# Patient Record
Sex: MALE | Race: OTHER | HISPANIC OR LATINO | URBAN - METROPOLITAN AREA
[De-identification: names, ages, dates, MRNs, and addresses within clinical notes are randomized per-mention and may not be internally consistent; named-entity substitution may affect disease eponyms.]

---

## 2022-03-04 ENCOUNTER — EMERGENCY (EMERGENCY)
Facility: HOSPITAL | Age: 37
LOS: 1 days | Discharge: HOME | End: 2022-03-04
Admitting: PERSONAL EMERGENCY RESPONSE ATTENDANT
Payer: MEDICAID

## 2022-03-04 VITALS
OXYGEN SATURATION: 100 % | DIASTOLIC BLOOD PRESSURE: 95 MMHG | HEART RATE: 90 BPM | RESPIRATION RATE: 18 BRPM | SYSTOLIC BLOOD PRESSURE: 160 MMHG | TEMPERATURE: 98 F

## 2022-03-04 PROCEDURE — 99284 EMERGENCY DEPT VISIT MOD MDM: CPT

## 2022-03-04 NOTE — ED ADULT TRIAGE NOTE - CHIEF COMPLAINT QUOTE
Pt c/o lump behind R. ear since today with R. facial numbness since 1 hour ago. Also endorsing h/a for 1 month that resolved 1 hr ago. Also c/o slight tongue numbness, denies tongue swelling. Denies dizziness, difficulty breathing, fever, chills. +b/l strength. +b/l sensation. No facial droop noted. Speech clear. MD Cortez for stroke eval. No code stroke called. Denies pmhx.

## 2022-03-05 NOTE — ED PROVIDER NOTE - NSFOLLOWUPINSTRUCTIONS_ED_ALL_ED_FT
Are there different types of headache?  Yes. There are different types of headache. The 2 most common types are:    ?Tension headaches – Tension headaches cause pressure or tightness on both sides of the head.    ?Migraine headaches – Migraine headaches often start off mild and then get worse. They often affect just 1 side of the head. The pain often feels like it is pounding or throbbing. Routine activities like walking or climbing stairs can make the headache worse. Migraines can also cause nausea or vomiting, or make you sensitive to light and sound.    Is there anything I can do to feel better when I have a headache?  Yes. Some people feel better if they:    ?Take non-prescription pain medicines (but check with your doctor first if you have a health condition or already take prescription medicines)    ?Lie down in a cool, dark, quiet room (this works best for migraine headaches)    Should I see a doctor or nurse?  See a doctor or nurse right away if:    ?Your headache comes on suddenly, quickly becomes severe, or could be described as "the worst headache of your life"    ?You have a fever or stiff neck with your headache    ?You also have a seizure, personality changes or confusion, or you pass out    ?Your headache began right after you exercised or had a minor injury    ?You have new headaches, especially if you are pregnant or older than 40    ?You have weakness, numbness, or trouble seeing (migraine headaches can sometimes cause these symptoms, but you should be seen right away the first time these symptoms happen)    You should also see a doctor or nurse if you get headaches often or if your headaches are severe.    Is there anything I can do to keep from getting headaches?  Yes. Some people find that their headaches are triggered by certain foods or things they do. To keep from getting headaches in the future, you can keep a "headache calendar." In the calendar, write down every time you have a headache and what you ate and did before it started. That way you can find out if there is anything you should avoid eating or doing. You can also write down what medicine you took for the headache and whether or not it helped.    Some common headache triggers include:    ?Stress    ?Skipping meals or eating too little    ?Having too little or too much caffeine    ?Sleeping too much or too little    ?Drinking alcohol    ?Certain drinks or foods    If your headaches are frequent, severe, or long-lasting, your doctor can suggest ways to try to prevent them. For example, it might help to learn relaxation techniques and ways to manage stress. In some cases, medicines can also help.    How is headache treated?  There are lots of medicines that can ease the pain of headaches. You can try taking acetaminophen (sample brand name: Tylenol), ibuprofen (sample brand names: Advil, Motrin), or naproxen (sample brand name: Aleve). There are prescription medicines that can help, too. The right medicine for you will depend on what type of headaches you get, how often you get them, and how bad they are.    If you get headaches often, work with your doctor to find a treatment that helps. Do not try to manage frequent headaches on your own with non-prescription pain medicines. Taking non-prescription pain medicines too often can actually cause more headaches later.

## 2022-03-05 NOTE — ED PROVIDER NOTE - OBJECTIVE STATEMENT
35 y/o M w/ no pertinent PMHx p/w right sided headache since 9 PM today with associated right sided facial numbness and a bump with swelling near the right ear. Pt reports a previous history of mild, intermittent headache that lasted approximately 1 month. Pt denies fever, chills, cough, rhinorrhea, blurry vision, double vision, or taking any medications for the symptoms.    Pt was offered a CT to assess complaints but refused.

## 2022-03-05 NOTE — ED PROVIDER NOTE - PATIENT PORTAL LINK FT
You can access the FollowMyHealth Patient Portal offered by [facility] by registering at the following website: [email or web address]. By joining [facility]’s FollowMyHealth portal, you will also be able to view your health information using other applications (apps) compatible with our system.

## 2022-03-05 NOTE — ED PROVIDER NOTE - CLINICAL SUMMARY MEDICAL DECISION MAKING FREE TEXT BOX
37 y/o M w/ no pertinent PMHx p/w right sided headache since 9 PM today with associated right sided facial numbness and a bump with swelling near the right ear. Pt reports a previous history of mild, intermittent headache that lasted approximately 1 month. Pt denies fever, chills, cough, rhinorrhea, blurry vision, double vision, or taking any medications for the symptoms. - offered CT Head but patient refused. Declined medication as well. Pt given strict instructions when to return to patient and family a bedside - they verbalized understanding.

## 2022-03-05 NOTE — ED PROVIDER NOTE - PHYSICAL EXAMINATION
CONSTITUTIONAL: Well-appearing; well-nourished; in no apparent distress. Non-toxic appearing.   NEURO: Alert & oriented. Gait steady without assistance. Sensory and motor functions are grossly intact.  PSYCH: Mood appropriate. Thought processes intact.   EYES: PERRL, EOMs intact, no nystagmus.  EARS: EACs without edema. Right TM with mild erythema, no bulging, light reflex present. Left TM WNL.  NECK: Supple. no cervical lymphadenopathy.   CARD: Regular rate and rhythm, no murmurs  RESP: No accessory muscle use; breath sounds clear and equal bilaterally; no wheezes, rhonchi, or rales     ABD: Soft; non-distended; non-tender.   SKIN: Warm; dry; no apparent lesions or exudate

## 2022-03-05 NOTE — ED PROVIDER NOTE - NS ED ROS FT
ROS:  GENERAL: No fever, no chills  EYES: as per HPI  HEENT: as per HPI  CARDIAC: no chest pain  PULMONARY: no cough, no shortness of breath  GI: no abdominal pain, no nausea, no vomiting, no diarrhea, no constipation  SKIN: no rashes  NEURO: as per HPI  MSK: No joint pain  All other systems reviewed as negative.

## 2022-03-05 NOTE — ED PROVIDER NOTE - CHIEF COMPLAINT
The patient is a 36y Male complaining of  The patient is a 36y Male complaining of HA with numbness on the R.

## 2022-04-26 ENCOUNTER — EMERGENCY (EMERGENCY)
Facility: HOSPITAL | Age: 37
LOS: 1 days | Discharge: HOME | End: 2022-04-26
Attending: STUDENT IN AN ORGANIZED HEALTH CARE EDUCATION/TRAINING PROGRAM | Admitting: STUDENT IN AN ORGANIZED HEALTH CARE EDUCATION/TRAINING PROGRAM
Payer: MEDICAID

## 2022-04-26 VITALS
OXYGEN SATURATION: 100 % | TEMPERATURE: 98 F | DIASTOLIC BLOOD PRESSURE: 83 MMHG | HEART RATE: 81 BPM | RESPIRATION RATE: 16 BRPM | SYSTOLIC BLOOD PRESSURE: 165 MMHG

## 2022-04-26 PROCEDURE — 99283 EMERGENCY DEPT VISIT LOW MDM: CPT

## 2022-04-26 RX ORDER — PHENYLEPHRINE HCL 0.25 %
2 AEROSOL, SPRAY WITH PUMP (ML) NASAL
Qty: 15 | Refills: 0
Start: 2022-04-26 | End: 2022-04-30

## 2022-04-26 NOTE — ED PROVIDER NOTE - NOSE FINDINGS
left nasal septal with inflammation, clotted nasal blood, no active bleeding, no septal hematoma, no ulceration, no congestion, no sinus tenderness, no rhinorrhea, no postnasal drip

## 2022-04-26 NOTE — ED PROVIDER NOTE - OBJECTIVE STATEMENT
37 yo M with no significant PMH presents to ER c/o intermittent left nostril bleeding for x3 days. Reports bleeding from left nostril, intermittent, lasting for a hour. Admits some bleeding today; denies no injury/trauma, nose picking. Admits the air is dry in the house. Denies any fever, chills, coughing, sneezing, headache, neck pain, dysphagia, n/v/d, abdominal pain, weakness, numbness, anticoagulation use or any other complaints.

## 2022-04-26 NOTE — ED PROVIDER NOTE - PROGRESS NOTE DETAILS
PA ROSA: Pt request PCP and ENT referral. Discussed with attending, patient can be discharged home to f/u with PCP, ENT. The patient was given verbal and written discharge instructions. Specifically, instructions when to return to the ED and when to seek follow-up from their pcp was discussed. Any specialty follow-up was discussed, including how to make an appointment.  Instructions were discussed in simple, plain language and was understood by the patient. The patient understands that should their symptoms worsen or any new symptoms arise, they should return to the ED immediately for further evaluation. All pt's questions were answered. Patient verbalizes understanding.

## 2022-04-26 NOTE — ED PROVIDER NOTE - NSFOLLOWUPINSTRUCTIONS_ED_ALL_ED_FT
Rest, drink plenty of fluids.  Advance activity as tolerated. Use nasal spray as directed. Recommend air humidifier. Follow up with your primary care physician in 48-72 hours- bring copies of your results.  Return to the ER for worsening or persistent symptoms, and/or ANY NEW OR CONCERNING SYMPTOMS. If you have issues obtaining follow up, please call: [phone] to obtain a doctor or specialist who takes your insurance in your area.      A nosebleed is when blood comes out of the nose. Nosebleeds are common and can be caused by many things. They are usually not a sign of a serious medical problem.      Follow these instructions at home:      When you have a nosebleed:      •Sit down.      •Tilt your head a little forward.    •Follow these steps:  1.Pinch your nose with a clean towel or tissue.      2.Keep pinching your nose for 5 minutes. Do not let go.      3.After 5 minutes, let go of your nose.      4.If there is still bleeding, do these steps again. Keep doing these steps until the bleeding stops.        • Do not put tissues or other things in your nose to stop the bleeding.      •Avoid lying down or putting your head back.      •Use a nose spray decongestant as told by your doctor.      After a nosebleed:     •Try not to blow your nose or sniffle for several hours.      •Try not to strain, lift, or bend at the waist for several days.    •Aspirin and blood-thinning medicines make bleeding more likely. If you take these medicines:  •Ask your doctor if you should stop taking them or if you should change how much you take.      •Do not stop taking the medicine unless your doctor tells you to.      •If your nosebleed was caused by dryness, use over-the-counter saline nasal spray or gel and humidifier as told by your doctor. This will keep the inside of your nose moist and allow it to heal. If you need to use one of these products:  •Choose one that is water-soluble.       •Use only as much as you need and use it only as often as needed.       •Do not lie down right away after you use it.      •If you get nosebleeds often, talk with your doctor about treatments. These may include:  •Nasal cautery. A chemical swab or electrical device is used to lightly burn tiny blood vessels inside the nose. This helps stop or prevent nosebleeds.      •Nasal packing. A gauze or other material is placed in the nose to keep constant pressure on the bleeding area.          Contact a doctor if:    •You have a fever.      •You get nosebleeds often.      •You are getting nosebleeds more often than usual.      •You bruise very easily.      •You have something stuck in your nose.      •You have bleeding in your mouth.      •You vomit or cough up brown material.      •You get a nosebleed after you start a new medicine.        Get help right away if:    •You have a nosebleed after you fall or hurt your head.      •Your nosebleed does not go away after 20 minutes.      •You feel dizzy or weak.      •You have unusual bleeding from other parts of your body.      •You have unusual bruising on other parts of your body.      •You get sweaty.      •You vomit blood.        Summary    •Nosebleeds are common. They are usually not a sign of a serious medical problem.      •When you have a nosebleed, sit down and tilt your head a little forward. Pinch your nose with a clean tissue for 5 minutes.      •Use saline spray or saline gel and a humidifier as told by your doctor.      •Get help right away if your nosebleed does not go away after 20 minutes.

## 2022-04-26 NOTE — ED PROVIDER NOTE - PATIENT PORTAL LINK FT
You can access the FollowMyHealth Patient Portal offered by [facility] by registering at the following website: [email or web address]. By joining [employer]’s FollowMyHealth portal, you will also be able to view your health information using other applications (apps) compatible with our system.

## 2022-04-26 NOTE — ED PROVIDER NOTE - CLINICAL SUMMARY MEDICAL DECISION MAKING FREE TEXT BOX
37 yo M with no significant PMH presents to ER c/o intermittent left nostril bleeding for x3 days. well appearing male. No injury/trauma/nose picking. Not on anticoagulation. Currently no active bleeding. Plan: d/c with nose spray and PCP follow up.

## 2022-04-26 NOTE — ED PROVIDER NOTE - NS ED ATTENDING STATEMENT MOD
This was a shared visit with the SOULEYMANE. I reviewed and verified the documentation and independently performed the documented:

## 2022-04-26 NOTE — ED ADULT TRIAGE NOTE - CHIEF COMPLAINT QUOTE
states" I am having nose bleed from left nostril " denies any pain. h/i sinus problem in the past. denies use of AC.

## 2022-04-26 NOTE — ED PROVIDER NOTE - ATTENDING APP SHARED VISIT CONTRIBUTION OF CARE
I have personally performed a face to face medical and diagnostic evaluation of the patient. I have discussed with and reviewed the SOULEYMANE's note and agree with the History, ROS, Physical Exam and MDM unless otherwise indicated. A brief summary of my personal evaluation and impression can be found below.    36M no pmh presents with a cc of intermittent episodes of nose bleeds lasting approx x1 hour over the last few days, no trauma, does endorse dry air as is living in a basement apartment, some nose picking no other FBs, had trace bleeding PTA earlier today has since resolved, otherwise has no complaints. Wanted to be checked out, has been applying pressure to nasal bone during bleeds. No other complaints. Denies n/v/f/c/cp/sob. Denies headache, syncope, lightheadedness, dizziness. Denies chest palpitations, abdominal pain. Denies edema. Denies dysuria, hematuria, BRBPR, tarry stools, diarrhea, constipation.     All other ROS negative, except as above and as per HPI and ROS section.    VITALS: Initial triage and subsequent vitals have been reviewed by me.  GEN APPEARANCE: WDWN, alert, non-toxic, NAD  HEAD: Atraumatic.  EYES: PERRLa, EOMI, vision grossly intact.   NECK: Supple  NOSE: L nare w mild erythema, no hematoma throat clear   CV: RRR, S1S2, no c/r/m/g. Cap refill <2 seconds. No bruits.   LUNGS: CTAB. No abnormal breath sounds.  ABDOMEN: Soft, NTND. No guarding or rebound.   MSK/EXT: No spinal or extremity point tenderness. No CVA ttp. Pelvis stable. No peripheral edema.  NEURO: Alert, follows commands. Weight bearing normal. Speech normal. Sensation and motor normal x4 extremities.   SKIN: Warm, dry and intact. No rash.  PSYCH: Appropriate    Plan/MDM:   36M no pmh presents with a cc of intermittent episodes of nose bleeds lasting approx x1 hour over the last few days, no trauma, does endorse dry air as is living in a basement apartment, some nose picking no other FBs, had trace bleeding PTA earlier today has since resolved, otherwise has no complaints. Wanted to be checked out, has been applying pressure to nasal bone during bleeds. No other complaints.  exam vss non toxic PE  as above ddx c/f intermittent anterior bleeds likely in setting of nose picking and dry air, will offer supportive care instruct on good nosebleed precautions send spray ent follow up, no bleeding at this time observed, stable for dc.

## 2022-05-11 PROBLEM — Z00.00 ENCOUNTER FOR PREVENTIVE HEALTH EXAMINATION: Status: ACTIVE | Noted: 2022-05-11

## 2022-05-19 ENCOUNTER — APPOINTMENT (OUTPATIENT)
Dept: INTERNAL MEDICINE | Facility: CLINIC | Age: 37
End: 2022-05-19

## 2022-06-23 ENCOUNTER — APPOINTMENT (OUTPATIENT)
Dept: OTOLARYNGOLOGY | Facility: CLINIC | Age: 37
End: 2022-06-23

## 2023-04-13 ENCOUNTER — APPOINTMENT (OUTPATIENT)
Dept: DERMATOLOGY | Facility: CLINIC | Age: 38
End: 2023-04-13
Payer: COMMERCIAL

## 2023-04-13 ENCOUNTER — NON-APPOINTMENT (OUTPATIENT)
Age: 38
End: 2023-04-13

## 2023-04-13 DIAGNOSIS — B35.1 TINEA UNGUIUM: ICD-10-CM

## 2023-04-13 DIAGNOSIS — L30.9 DERMATITIS, UNSPECIFIED: ICD-10-CM

## 2023-04-13 DIAGNOSIS — F98.8 OTHER SPECIFIED BEHAVIORAL AND EMOTIONAL DISORDERS WITH ONSET USUALLY OCCURRING IN CHILDHOOD AND ADOLESCENCE: ICD-10-CM

## 2023-04-13 PROCEDURE — 99204 OFFICE O/P NEW MOD 45 MIN: CPT

## 2023-04-13 RX ORDER — FLUOCINONIDE 0.5 MG/G
0.05 CREAM TOPICAL TWICE DAILY
Qty: 1 | Refills: 1 | Status: ACTIVE | COMMUNITY
Start: 2023-04-13 | End: 1900-01-01

## 2023-04-13 RX ORDER — CICLOPIROX OLAMINE 7.7 MG/ML
0.77 SUSPENSION TOPICAL
Qty: 1 | Refills: 4 | Status: ACTIVE | COMMUNITY
Start: 2023-04-13 | End: 1900-01-01

## 2023-04-13 NOTE — ASSESSMENT
[FreeTextEntry1] : Eczema\par Education.\par Lidex cream bid.\par Emollients.\par Avoid trauma to regions.\par \par Fingernails secondary to biting.\par Education.\par Avoid biting.\par Cutemol cream tid.\par \par Onychomycosis, toenails.\par Discussed po lamisil.  Would check labs after 1 month.\par Patient opts to topical tx for now.\par loprox solution bid.\par Follow.

## 2023-04-13 NOTE — HISTORY OF PRESENT ILLNESS
[FreeTextEntry1] : Patient for rash, arms and legs. [de-identified] : Irritated, itching rash, results in darkening of the skin.\par Also with fingernail changes, admits to biting fingernails.\par Also c/o toenail changes.\par patient works as an .

## 2023-04-13 NOTE — PHYSICAL EXAM
[Alert] : alert [Oriented x 3] : ~L oriented x 3 [Well Nourished] : well nourished [FreeTextEntry3] : hyperpigmentation and erythematous, crusted patches, bilateral UE's, LE's and left scalp.\par Scaling patch, left > right palm.\par \par Distal dystrophic changes, fingernails.\par \par Distal onycholysis, subungual HK of the right 2nd/3rd toenails.

## 2023-04-20 ENCOUNTER — EMERGENCY (EMERGENCY)
Facility: HOSPITAL | Age: 38
LOS: 1 days | Discharge: HOME | End: 2023-04-20
Attending: EMERGENCY MEDICINE | Admitting: EMERGENCY MEDICINE
Payer: COMMERCIAL

## 2023-04-20 VITALS
RESPIRATION RATE: 16 BRPM | SYSTOLIC BLOOD PRESSURE: 160 MMHG | HEART RATE: 89 BPM | OXYGEN SATURATION: 96 % | TEMPERATURE: 99 F | DIASTOLIC BLOOD PRESSURE: 92 MMHG

## 2023-04-20 PROCEDURE — 99283 EMERGENCY DEPT VISIT LOW MDM: CPT

## 2023-04-20 NOTE — ED PROVIDER NOTE - NSFOLLOWUPINSTRUCTIONS_ED_ALL_ED_FT
Follow up with your primary care physician    Return to ER for inability to move knee, fever, redness overlying knee or any other complaints in which you feel you require immediate attention    As we discussed rest, ice, compression, elevation can help remove some of the fluid.

## 2023-04-20 NOTE — ED PROVIDER NOTE - CLINICAL SUMMARY MEDICAL DECISION MAKING FREE TEXT BOX
Impression  Likely suprapatellar bursitis no warmth to fluid full range of motion of knee  Have recommended rest ice compression elevation  have applied Ace wrap over effusion  We will discharge patient home

## 2023-04-20 NOTE — ED PROVIDER NOTE - OBJECTIVE STATEMENT
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  States has not put knees on the ground.  Does not wear kneepads.  Today noticed swelling above the knee.  Denies any fevers.  Able to ambulate and range knee.  States somewhat painful due to the swelling.

## 2023-04-20 NOTE — ED ADULT TRIAGE NOTE - CHIEF COMPLAINT QUOTE
c/o nontraumatic right leg pain that started today after getting up from sitting position. Swelling noted to lateral side of thigh above right knee.

## 2023-04-20 NOTE — ED PROVIDER NOTE - PHYSICAL EXAMINATION
Physical exam  Well-appearing in no distress  Afebrile  Vital signs stable  Extremities right knee full range of motion no crepitus  Suprapatellar effusion more appreciable on the lateral aspect

## 2023-04-20 NOTE — ED PROVIDER NOTE - PATIENT PORTAL LINK FT
You can access the FollowMyHealth Patient Portal offered by [facility] by registering at the following website: [email or web address]. By joining [employer]’s FollowMyHealth portal, you will also be able to view your health information using other applications (apps) compatible with our system.

## 2024-06-24 ENCOUNTER — EMERGENCY (EMERGENCY)
Facility: HOSPITAL | Age: 39
LOS: 1 days | Discharge: HOME | End: 2024-06-24
Admitting: EMERGENCY MEDICINE
Payer: MEDICAID

## 2024-06-24 VITALS
HEART RATE: 100 BPM | SYSTOLIC BLOOD PRESSURE: 159 MMHG | RESPIRATION RATE: 18 BRPM | OXYGEN SATURATION: 99 % | TEMPERATURE: 98 F | DIASTOLIC BLOOD PRESSURE: 95 MMHG

## 2024-06-24 VITALS
HEART RATE: 111 BPM | WEIGHT: 212.08 LBS | SYSTOLIC BLOOD PRESSURE: 158 MMHG | RESPIRATION RATE: 16 BRPM | OXYGEN SATURATION: 100 % | DIASTOLIC BLOOD PRESSURE: 87 MMHG | HEIGHT: 69 IN | TEMPERATURE: 98 F

## 2024-06-24 PROCEDURE — 99283 EMERGENCY DEPT VISIT LOW MDM: CPT

## 2024-06-24 NOTE — ED PROVIDER NOTE - PATIENT PORTAL LINK FT
You can access the FollowMyHealth Patient Portal offered by [facility] by registering at the following website: [email or web address]. By joining [employer]’s FollowMyHealth portal, you will also be able to view your health information using other applications (apps) compatible with our system.

## 2024-06-24 NOTE — ED PROVIDER NOTE - OBJECTIVE STATEMENT
38yM w/no stated pmhx presenting to the ED with bilateral eye redness. Pt states 4 days ago he developed left eye redness with discharge, redness then spread to the right eye 2 days ago. He was seen at Urgent Care 2 days ago and was started on oflaxacin eye drops which 38yM w/no stated pmhx presenting to the ED with bilateral eye redness. Pt states 4 days ago he developed left eye redness with discharge, redness then spread to the right eye 2 days ago. He was seen at Urgent Care 2 days ago and was started on Oflaxacin eye drops which he has been using one drop to each eye 4 times a day. Pt states he feels the redness and discharge from the left eye is worsening. Pt reports at times left eye has blurry vision due to discharge but once he wipes it the symptoms resolve. Pt denies fever/chills, headache, dizziness, photophobia, double vision, numbness, weakness, cp, sob, abd pain, n/v/d, recent travel or illness or any other concerns.

## 2024-06-24 NOTE — ED PROVIDER NOTE - CLINICAL SUMMARY MEDICAL DECISION MAKING FREE TEXT BOX
38yM w/no stated pmhx presenting to the ED with bilateral eye redness. Pt states 4 days ago he developed left eye redness with discharge, redness then spread to the right eye 2 days ago. He was seen at Urgent Care 2 days ago and was started on Oflaxacin eye drops which he has been using one drop to each eye 4 times a day. Pt states he feels the redness and discharge from the left eye is worsening. Pt reports at times left eye has blurry vision due to discharge but once he wipes it the symptoms resolve. Pt denies fever/chills, headache, dizziness, photophobia, double vision, numbness, weakness, cp, sob, abd pain, n/v/d, recent travel or illness or any other concerns. On exam pt is well appearing, afebrile, VA intact, pt does not wear glasses on contacts, b/l conjunctival erythema/injection with discharge, concerning for bacterial conjunctivitis, no fluorescin uptake with woods lamp. Plan: continue ofloxacin drops, will have pt use 2 drops 4 times a day and erythromycin ointment at bedtime. Optho information provided if symptoms persist, he will return with any new or worsening symptoms.

## 2024-06-24 NOTE — ED ADULT TRIAGE NOTE - CHIEF COMPLAINT QUOTE
Assistance OOB with selected safe patient handling equipment/Communicate Risk of Fall with Harm to all staff/Discuss with provider need for PT consult/Monitor gait and stability/Provide patient with walking aids - walker, cane, crutches/Reinforce activity limits and safety measures with patient and family/Tailored Fall Risk Interventions/Visual Cue: Yellow wristband and red socks/Bed in lowest position, wheels locked, appropriate side rails in place/Call bell, personal items and telephone in reach/Instruct patient to call for assistance before getting out of bed or chair/Non-slip footwear when patient is out of bed/[place] to call system/Physically safe environment - no spills, clutter or unnecessary equipment/Purposeful Proactive Rounding/Room/bathroom lighting operational, light cord in reach c/o BL eye redness x days. Placed on eye drops for 2 days by [facility] was educated he had an "eye infection"  w/ no relief. Bl sclera of eye noted with redness Reports blurry vision due to "eye discharge" Denies fevers, chills, N/V/D  denies hx

## 2024-06-24 NOTE — ED PROVIDER NOTE - PHYSICAL EXAMINATION
b/l conjunctival erythema/injection, left more than right, +clear/mucous like discharge from the left eye  PERRL, EOMS intact, no significant swelling to eye lids b/l conjunctival erythema/injection, left more than right, +clear/mucous like discharge from the left eye  PERRL, EOMS intact, no significant swelling to eye lids  no fluorescin uptake with Woods Lamp Exam

## 2024-06-26 ENCOUNTER — EMERGENCY (EMERGENCY)
Facility: HOSPITAL | Age: 39
LOS: 1 days | Discharge: HOME | End: 2024-06-26
Admitting: EMERGENCY MEDICINE
Payer: MEDICAID

## 2024-06-26 VITALS
OXYGEN SATURATION: 100 % | RESPIRATION RATE: 17 BRPM | SYSTOLIC BLOOD PRESSURE: 140 MMHG | TEMPERATURE: 98 F | HEIGHT: 69 IN | WEIGHT: 212.08 LBS | HEART RATE: 95 BPM | DIASTOLIC BLOOD PRESSURE: 84 MMHG

## 2024-06-26 PROCEDURE — 99284 EMERGENCY DEPT VISIT MOD MDM: CPT

## 2024-06-27 VITALS
TEMPERATURE: 99 F | HEART RATE: 77 BPM | SYSTOLIC BLOOD PRESSURE: 140 MMHG | RESPIRATION RATE: 18 BRPM | OXYGEN SATURATION: 100 % | DIASTOLIC BLOOD PRESSURE: 94 MMHG

## 2024-06-27 RX ORDER — LANOLIN/MINERAL OIL/PETROLATUM
1 OINTMENT (GRAM) OPHTHALMIC (EYE)
Refills: 0 | Status: DISCONTINUED | OUTPATIENT
Start: 2024-06-27 | End: 2024-06-30

## 2024-06-27 RX ADMIN — Medication 1 DROP(S): at 02:31

## 2024-06-27 RX ADMIN — Medication 1 DROP(S): at 02:26

## 2024-07-01 ENCOUNTER — NON-APPOINTMENT (OUTPATIENT)
Age: 39
End: 2024-07-01

## 2024-07-01 ENCOUNTER — APPOINTMENT (OUTPATIENT)
Dept: OPHTHALMOLOGY | Facility: CLINIC | Age: 39
End: 2024-07-01
Payer: MEDICAID

## 2024-07-01 PROCEDURE — 92002 INTRM OPH EXAM NEW PATIENT: CPT

## 2024-07-02 ENCOUNTER — APPOINTMENT (OUTPATIENT)
Dept: OPHTHALMOLOGY | Facility: CLINIC | Age: 39
End: 2024-07-02
Payer: MEDICAID

## 2024-07-02 ENCOUNTER — NON-APPOINTMENT (OUTPATIENT)
Age: 39
End: 2024-07-02

## 2024-07-02 PROCEDURE — 92012 INTRM OPH EXAM EST PATIENT: CPT

## 2024-07-05 ENCOUNTER — NON-APPOINTMENT (OUTPATIENT)
Age: 39
End: 2024-07-05

## 2024-07-05 ENCOUNTER — APPOINTMENT (OUTPATIENT)
Dept: OPHTHALMOLOGY | Facility: CLINIC | Age: 39
End: 2024-07-05
Payer: MEDICAID

## 2024-07-05 PROCEDURE — 92012 INTRM OPH EXAM EST PATIENT: CPT

## 2024-07-12 ENCOUNTER — APPOINTMENT (OUTPATIENT)
Dept: OPHTHALMOLOGY | Facility: CLINIC | Age: 39
End: 2024-07-12

## 2024-09-13 ENCOUNTER — EMERGENCY (EMERGENCY)
Facility: HOSPITAL | Age: 39
LOS: 0 days | Discharge: HOME | End: 2024-09-14
Attending: EMERGENCY MEDICINE
Payer: MEDICAID

## 2024-09-13 VITALS
TEMPERATURE: 99 F | DIASTOLIC BLOOD PRESSURE: 61 MMHG | WEIGHT: 199.96 LBS | HEART RATE: 82 BPM | OXYGEN SATURATION: 98 % | SYSTOLIC BLOOD PRESSURE: 104 MMHG | HEIGHT: 69 IN | RESPIRATION RATE: 16 BRPM

## 2024-09-13 DIAGNOSIS — Z87.898 PERSONAL HISTORY OF OTHER SPECIFIED CONDITIONS: ICD-10-CM

## 2024-09-13 LAB
ALBUMIN SERPL ELPH-MCNC: 2.5 G/DL — LOW (ref 3.3–5)
ALP SERPL-CCNC: 76 U/L — SIGNIFICANT CHANGE UP (ref 40–120)
ALT FLD-CCNC: 40 U/L — SIGNIFICANT CHANGE UP (ref 12–78)
AMPHET UR-MCNC: NEGATIVE — SIGNIFICANT CHANGE UP
ANION GAP SERPL CALC-SCNC: 3 MMOL/L — LOW (ref 5–17)
APAP SERPL-MCNC: <2 UG/ML — LOW (ref 10–30)
AST SERPL-CCNC: 55 U/L — HIGH (ref 15–37)
BARBITURATES UR SCN-MCNC: NEGATIVE — SIGNIFICANT CHANGE UP
BASOPHILS # BLD AUTO: 0.08 K/UL — SIGNIFICANT CHANGE UP (ref 0–0.2)
BASOPHILS NFR BLD AUTO: 0.9 % — SIGNIFICANT CHANGE UP (ref 0–2)
BENZODIAZ UR-MCNC: NEGATIVE — SIGNIFICANT CHANGE UP
BILIRUB SERPL-MCNC: 0.8 MG/DL — SIGNIFICANT CHANGE UP (ref 0.2–1.2)
BUN SERPL-MCNC: 10 MG/DL — SIGNIFICANT CHANGE UP (ref 7–23)
CALCIUM SERPL-MCNC: 9.1 MG/DL — SIGNIFICANT CHANGE UP (ref 8.5–10.1)
CHLORIDE SERPL-SCNC: 112 MMOL/L — HIGH (ref 96–108)
CO2 SERPL-SCNC: 29 MMOL/L — SIGNIFICANT CHANGE UP (ref 22–31)
COCAINE METAB.OTHER UR-MCNC: NEGATIVE — SIGNIFICANT CHANGE UP
CREAT SERPL-MCNC: 0.83 MG/DL — SIGNIFICANT CHANGE UP (ref 0.5–1.3)
EGFR: 115 ML/MIN/1.73M2 — SIGNIFICANT CHANGE UP
EOSINOPHIL # BLD AUTO: 0.31 K/UL — SIGNIFICANT CHANGE UP (ref 0–0.5)
EOSINOPHIL NFR BLD AUTO: 3.7 % — SIGNIFICANT CHANGE UP (ref 0–6)
ETHANOL SERPL-MCNC: <10 MG/DL — SIGNIFICANT CHANGE UP (ref 0–10)
GLUCOSE SERPL-MCNC: 89 MG/DL — SIGNIFICANT CHANGE UP (ref 70–99)
HCT VFR BLD CALC: 37.6 % — LOW (ref 39–50)
HGB BLD-MCNC: 12.9 G/DL — LOW (ref 13–17)
IMM GRANULOCYTES NFR BLD AUTO: 0.2 % — SIGNIFICANT CHANGE UP (ref 0–0.9)
LYMPHOCYTES # BLD AUTO: 2.39 K/UL — SIGNIFICANT CHANGE UP (ref 1–3.3)
LYMPHOCYTES # BLD AUTO: 28.3 % — SIGNIFICANT CHANGE UP (ref 13–44)
MANUAL SMEAR VERIFICATION: SIGNIFICANT CHANGE UP
MCHC RBC-ENTMCNC: 32.9 PG — SIGNIFICANT CHANGE UP (ref 27–34)
MCHC RBC-ENTMCNC: 34.3 G/DL — SIGNIFICANT CHANGE UP (ref 32–36)
MCV RBC AUTO: 95.9 FL — SIGNIFICANT CHANGE UP (ref 80–100)
METHADONE UR-MCNC: NEGATIVE — SIGNIFICANT CHANGE UP
MONOCYTES # BLD AUTO: 0.85 K/UL — SIGNIFICANT CHANGE UP (ref 0–0.9)
MONOCYTES NFR BLD AUTO: 10.1 % — SIGNIFICANT CHANGE UP (ref 2–14)
NEUTROPHILS # BLD AUTO: 4.8 K/UL — SIGNIFICANT CHANGE UP (ref 1.8–7.4)
NEUTROPHILS NFR BLD AUTO: 56.8 % — SIGNIFICANT CHANGE UP (ref 43–77)
NRBC # BLD: 0 /100 WBCS — SIGNIFICANT CHANGE UP (ref 0–0)
OPIATES UR-MCNC: NEGATIVE — SIGNIFICANT CHANGE UP
PCP SPEC-MCNC: SIGNIFICANT CHANGE UP
PCP UR-MCNC: NEGATIVE — SIGNIFICANT CHANGE UP
PLAT MORPH BLD: NORMAL — SIGNIFICANT CHANGE UP
PLATELET # BLD AUTO: 113 K/UL — LOW (ref 150–400)
POTASSIUM SERPL-MCNC: 3.7 MMOL/L — SIGNIFICANT CHANGE UP (ref 3.5–5.3)
POTASSIUM SERPL-SCNC: 3.7 MMOL/L — SIGNIFICANT CHANGE UP (ref 3.5–5.3)
PROT SERPL-MCNC: 6.7 GM/DL — SIGNIFICANT CHANGE UP (ref 6–8.3)
RBC # BLD: 3.92 M/UL — LOW (ref 4.2–5.8)
RBC # FLD: 15.1 % — HIGH (ref 10.3–14.5)
RBC BLD AUTO: NORMAL — SIGNIFICANT CHANGE UP
SALICYLATES SERPL-MCNC: <1.7 MG/DL — LOW (ref 2.8–20)
SODIUM SERPL-SCNC: 144 MMOL/L — SIGNIFICANT CHANGE UP (ref 135–145)
THC UR QL: POSITIVE — SIGNIFICANT CHANGE UP
WBC # BLD: 8.45 K/UL — SIGNIFICANT CHANGE UP (ref 3.8–10.5)
WBC # FLD AUTO: 8.45 K/UL — SIGNIFICANT CHANGE UP (ref 3.8–10.5)

## 2024-09-13 PROCEDURE — 99284 EMERGENCY DEPT VISIT MOD MDM: CPT

## 2024-09-13 RX ORDER — SODIUM CHLORIDE 9 MG/ML
1000 INJECTION INTRAMUSCULAR; INTRAVENOUS; SUBCUTANEOUS ONCE
Refills: 0 | Status: COMPLETED | OUTPATIENT
Start: 2024-09-13 | End: 2024-09-13

## 2024-09-13 RX ADMIN — SODIUM CHLORIDE 1000 MILLILITER(S): 9 INJECTION INTRAMUSCULAR; INTRAVENOUS; SUBCUTANEOUS at 21:54

## 2024-09-13 NOTE — ED ADULT NURSE NOTE - CHIEF COMPLAINT QUOTE
pt accompanied with sister, as per sister states returned from [place] for alcohol rehab (60 days), as per sister noticed to be more drowsy and facial swelling noticed on tuesday. pt states "I'm fine" pt taking antidepressant and antipsychotics. pt denies SI, depression, HI, hallucinations

## 2024-09-13 NOTE — ED ADULT TRIAGE NOTE - CHIEF COMPLAINT QUOTE
pt accompanied with sister, as per sister states returned from [place] for alcohol rehab (60 days), as per sister noticed to be more drowsy and facial swelling noticed on tuesday. pt states "I'm fine" pt taking antidepressant and antipsychotics. pt denies SI, depression, HI, hallucinations

## 2024-09-13 NOTE — ED ADULT NURSE NOTE - OBJECTIVE STATEMENT
pt accompanied with sister, as per sister states returned from [place] for alcohol rehab (60 days), as per sister noticed to be more drowsy and facial swelling noticed on tuesday. pt states "I'm fine" pt taking antidepressant and antipsychotics. pt denies SI, depression, HI, hallucinations.   pt on tapering dose of medication set to see PMD on 9/24 but sister is concerned about pt "affect" and facial swelling.

## 2024-09-13 NOTE — ED ADULT NURSE NOTE - NSFALLUNIVINTERV_ED_ALL_ED
Bed/Stretcher in lowest position, wheels locked, appropriate side rails in place/Call bell, personal items and telephone in reach/Instruct patient to call for assistance before getting out of bed/chair/stretcher/Non-slip footwear applied when patient is off stretcher/[place]inne to call system/Physically safe environment - no spills, clutter or unnecessary equipment/Purposeful proactive rounding/Room/bathroom lighting operational, light cord in reach

## 2024-09-13 NOTE — ED ADULT TRIAGE NOTE - TEMPERATURE IN FAHRENHEIT (DEGREES F)
Physical Therapy Daily Treatment     Pt arrived 5 minutes late.   Visit Count: 7  Plan of Care Dates: Initial: 8/6/2018 Through: 10/1/2018  Insurance Information:   MEDICARE- PRIMARY  $2010 CAP/ $0 USED  USE G CODES  MEDICARE AARP- SECONDARY  Next Referring Provider Visit: prn    Referred by: Jemal Pike MD  Medical Diagnosis (from order):  724.3 (ICD-9-CM) - M54.32 (ICD-10-CM) - Sciatica of left side  Insurance: 1. MEDICARE  2. AARP    Date of onset/injury: June 2018  Diagnosis Precautions: none  Chart reviewed: Relevant co-morbidities, allergies, tests and medications: No relevant allergies/medications.       Patient Active Problem List   Diagnosis   • Kidney stones   • Subclinical hypothyroidism   • Depression with anxiety   • Erectile dysfunction   • Transient global amnesia   • Peripheral vascular disease (CMS/HCC)   • Pure hypercholesterolemia   • Gastroesophageal reflux disease without esophagitis   • JULITA (obstructive sleep apnea)   • Vitamin D deficiency      Lumbar x-ray (6/15/18):  IMPRESSION: Minimal lumbar degenerative change.      SUBJECTIVE   Pt reports increased symptoms with driving in car with increased pressure near front of the seat on leg and notes increased numbness (Monday). Pt notes increased numbness this date. With walking on track (approximately 1 mile) at [facility] earlier this week (Tuesday) he had increased numbness in LLE. With discontinuation of bridging exercise, HEP does not increase symptoms.     Current Pain: 0-1/10 buttocks, tingling in LLE to foot.    Functional Change: Able to sit > 30-40 minutes.     OBJECTIVE   Palpation:  Positive tenderness at left proximal hamstring attachment and left piriformis. Large tender point noted at proximal left HS and medial aspect of left piriformis.     Treatment   Therapeutic Exercise:   -nustep level 3 x 7 minutes    -seated HS stretch - 30 second hold x 2 reps each side   -child's pose stretch - 30 second hold x 2 reps   -hooklying lower  PRE-SEDATION ASSESSMENT    CONSENT  Risks, benefits, and alternatives have been discussed with the patient/patient representative, and patient/patient representative agrees to proceed: Yes    MEDICAL HISTORY  Significant medical/surgical history: Yes  Past Complications with Sedation/Anesthesia: No  Significant Family History: No  Smoking History: No  Alcohol/Drug abuse: No  Possible Pregnancy (LMP): Not Applicable  Cardiac History: No  Respiratory History: Yes    PHYSICAL EXAM  History and Physical Reviewed: Patient has valid H&P within 30 days. I have reviewed and there are no changes.  Airway Risk History: No previous complications  Airway Anatomy : Class III  Heart : Normal  Lungs : Abnormal  Lungs (Comment) : Markedly diminished breath sounds on the right subscapular area with regional dullness to percussion  LOC/Mental Status : Normal    OTHER FINDINGS  Reviewed current medications and allergies: Yes  Pertinent lab/diagnostic test reviewed: Yes    SEDATION RISK ASSESSMENT  Risk Status ASA: Class II - Normal patient with mild systemic disease  Plan for Sedation: Moderate Sedation  Indications for Procedure/Pre-Procedure Diagnosis and Planned Procedure: Right-sided pleural effusion of etiology to be determined.  The most likely diagnosis is traumatic hemothorax, however other etiologies need to be entertained including the possibility of malignancy.  This will be done under local anaesthesia with minimal to moderate sedation titrated to her comfort level  EKG Monitoring: Yes    NARRATIVE FINDINGS      trunk rotation stretch - 30 second hold x 2 reps each side  -SL hip ABD x 10 reps each side       Manual Therapy:   Patient educated on benefit, potential side effects (such as increased pain or ecchymosis), and use of IASTM. Pt agreeable to treatment. IASTM performed to left HS and lateral ITB with patient in right SL position. Scanning of tissue revealed restrictions at mid hamstring and left ITB throughout. Use of boomerrang tool with sweeping/fanning technique. Post-treatment ROM measurements taken. Post assessment: petechiae noted throughout both areas. Pt instructed to hold on ice application for remainder of day.       Current Home Program (not performed this date except as noted above):   -seated HS stretch  -seated piriformis ER stretch      - SL hip abduction  - ab brace + knee lift   -sit to stand  -4 point LE extension  -child's pose stretch    ASSESSMENT   70 year old male presents to therapy with significant decline in prior level of function due to signs and symptoms consistent with possible piriformis syndrome LLE leading to LLE sciatica. Trial of IASTM due to increased symptoms the past 2-3 days. Notes mild \"achiness\" at end of session related to manual therapy. Denies numbness end of session with ambulation. Pt to continue to benefit from skilled PT for progression of strengthening/exercise, body mechanics training, and pain management for improved tolerance of symptoms with daily activities.      Pain after treatment: 0/10 \"the numbness is gone\"  Result of above outlined education: Verbalizes understanding and Needs reinforcement    Goals:       To be obtained by end of this plan of care:  1. Patient independent with modified and progressed home exercise program.  2. Patient will report decreased lumbar/LE pain/symptoms to 1-2/10 to aid in normalization of gait for independent living, ambulating 30 minutes for independent living, sitting/standing for 30 minutes to cook/eat a meal, age appropriate  activities and sleeping undisturbed through a night.   3. Patient will increase involved strength to 4+/5 to aid in ambulating 30 minutes for independent living, sitting/standing for 30 minutes to cook/eat a meal, returning to community activities and lifting as required.  4. Patient will demonstrate proper body mechanics for sitting and standing.  5. Patient will decrease radicular symptoms by 50 % to aid in sitting/standing for 30 minutes to cook/eat a meal, returning to community activities, age appropriate activities and sleeping undisturbed through a night.  6. Patient will be able to sleep 6 hours without disruption from pain.   7. Patient will be able to tolerate sitting activities for greater than or equal to 30 minutes without  pain/difficulty.  8. Lower Extremity Functional Scale: Patient will complete form to reflect an improved score from initial score of 56 to greater than or equal to 66 (0=extreme difficulty; 80=no difficulty) to indicate pt reported improvement in function/disability/impairment (minimal detectable change: 9 points).     PLAN     -Follow up with IAS  -progress strengthening with core activation    THERAPY DAILY BILLING   Primary Insurance:  MEDICARE  Secondary Insurance: [facility]    Evaluation Procedures:  No evaluation codes were used on this date of service    Timed Procedures:  Manual Therapy, 11 minutes  Therapeutic Exercise, 29 minutes    Untimed Procedures:  No untimed codes were used on this date of service    Total Treatment Time: 40 minutes        G-Code:  G-Code Score ABN form  reporting not required this treatment session  Modifier based on outcome measure(s)/functional testing/clinical judgement as listed above    The referring provider's electronic or written signature on the evaluation authorizes the therapy plan of care and certifies the need for these services, furnished under this plan of care while under their care.  Referring provider signature on file      98.7 no

## 2024-09-14 NOTE — ED PROVIDER NOTE - CLINICAL SUMMARY MEDICAL DECISION MAKING FREE TEXT BOX
Pt is well appearing, has no new complaints and able to walk with normal gait. Pt will take medications as prescribed. Pt is stable for discharge and follow up with medical doctor. Pt educated on care and need for follow up. Discussed anticipatory guidance and return precautions. Questions answered. I had a detailed discussion with the patient regarding the historical points, exam findings, and any diagnostic results supporting the discharge diagnosis.

## 2024-09-14 NOTE — ED PROVIDER NOTE - NSDCPRINTRESULTS_ED_ALL_ED
Patient seen and examined. Pain controlled. No acute events overnight per nursing. Daughter present at bedside, states mental status still not at baseline.        MEDICATIONS  (STANDING):  ascorbic acid 500 milliGRAM(s) Oral two times a day  carbidopa/levodopa  25/100 1 Tablet(s) Oral three times a day  docusate sodium 100 milliGRAM(s) Oral three times a day  enoxaparin Injectable 40 milliGRAM(s) SubCutaneous every 24 hours  ferrous    sulfate 325 milliGRAM(s) Oral three times a day with meals  folic acid 1 milliGRAM(s) Oral daily  iron sucrose Injectable 100 milliGRAM(s) IV Push every 24 hours  magnesium oxide 400 milliGRAM(s) Oral daily  multivitamin 1 Tablet(s) Oral daily  tamsulosin 0.4 milliGRAM(s) Oral at bedtime    Allergies    No Known Allergies    Intolerances                            9.2    8.89  )-----------( 196      ( 23 Dec 2018 06:16 )             27.2     23 Dec 2018 06:16    143    |  109    |  35     ----------------------------<  106    4.1     |  28     |  0.89     Ca    7.8        23 Dec 2018 06:16    TPro  4.7    /  Alb  x      /  TBili  x      /  DBili  x      /  AST  x      /  ALT  x      /  AlkPhos  x      22 Dec 2018 09:56      Vital Signs Last 24 Hrs  T(C): 37.9 (12-22-18 @ 23:48), Max: 37.9 (12-22-18 @ 23:48)  T(F): 100.2 (12-22-18 @ 23:48), Max: 100.2 (12-22-18 @ 23:48)  HR: 88 (12-22-18 @ 23:48) (88 - 88)  BP: 135/65 (12-22-18 @ 23:48) (104/60 - 135/65)  RR: 16 (12-22-18 @ 23:48) (16 - 16)  SpO2: 100% (12-22-18 @ 23:48) (98% - 100%)    Physical Exam  Gen: NAD  LLE:   Dressing c/d/i  +ehl/fhl/ta/gs function- withdrawal from pain/foot stimuli  unable to assess sensation 2/2 pt mental status  Dp/pt pulse intact  Compartments soft    A/P: 83y Female sp L Hip IM Nail POD 3  Pain control  DVT ppx- Lovenox 40mg sq daily for 30 days  PT/WBAT/OOB  FU labs  Ice/elevate  Medical management appreciated  Incentive spirometry  No further orthopedic sx intervention at this time  Please Fu with Dr Souza in the office in 1-2 weeks after DC  Ortho Stable for DC pending stable H&H Patient requests all Lab, Cardiology, and Radiology Results on their Discharge Instructions

## 2024-09-14 NOTE — ED PROVIDER NOTE - PHYSICAL EXAMINATION
No distress, cooperative.   No signs of facial swelling, no angioedema, no lip or tongue or uvula swelling.  Sister states patient's appearance is in her usual state of health.  No guarding to full palpation of abdomen, no icterus, no jaundice, no tremors.

## 2024-09-14 NOTE — ED PROVIDER NOTE - OBJECTIVE STATEMENT
38-year-old male accompanied with sister, Patient is alert and oriented to himself, his date of birth, current date and is without complaints.  Patient is cooperative and denies any suicidal or homicidal ideation.  Patient has no auditory visual hallucinations.  Patient was recently admitted for alcohol detox in [place] for 60 days and discharged 1 week ago.  [place] returned to [place] 6 days ago.  During patient's course of admission for detox he was prescribed the following medications: Trazodone, Ativan, Zyprexa and Zoloft.  Patient and family members requesting directions of whether or not he is to continue all these medications.  Patient's discharge summary states patient to taper off all medications except Zoloft.  Medication plan was reexplain to patient and patient's sister.  Both patient and sister has better understanding of medication regimen.  Patient has no abdominal pain, no chest pain, no shortness of breath, no nausea, no vomiting, no tremors.

## 2024-09-14 NOTE — ED PROVIDER NOTE - NSFOLLOWUPINSTRUCTIONS_ED_ALL_ED_FT
Substance use disorder is a condition in which a person is dependent on a substance, such as drugs or alcohol. A mental illness is a condition that occurs when someone experiences changes in mood, behavior, or thinking. Sometimes, these two conditions can occur at the same time (co-occurring disorders) and may be diagnosed together (dual diagnosis).    What is the relationship between substance use disorder and mental illness?  Substance use disorder and mental illness can share symptoms and can have similar causes, such as exposure to stress or changes in brain chemicals. The risk for developing both of these conditions can be passed from parent to child (inherited). People with mental illnesses sometimes use drugs to try to relieve symptoms, and this can lead to substance use disorder. Substance use disorders occur more often in people who have depression, schizophrenia, anxiety, or personality disorders.    Also, when some drugs are used regularly, they can cause people to have symptoms of mental illness.    What are the signs or symptoms?  Symptoms vary widely for substance use disorder and mental illness, especially because these conditions can be present at the same time.    Signs of a substance use disorder    Failure to meet responsibilities at home, work, or school.  Using substances in risky situations, such as while driving or using machinery.  Taking serious risks to get drugs or alcohol.  Spending less time on activities or hobbies that used to be important.  Changes in personality or attitude for no reason, such as angry outbursts, symptoms of anxiety, or unusual giddiness.  Trying to hide the amount of drugs or alcohol used.  Increased substance use over time, or needing to use more of a substance to feel the same effects (developing a tolerance).  Physical symptoms may include:  Sudden weight loss or gain.  Sleeping too much or too little.  Uncontrolled trembling or shaking (tremors), slurred speech, or lack of coordination.  Continuing to use alcohol or a drug even though using it has led to bad outcomes or consequences, such as losing a job or ending a relationship.  Signs and symptoms of mental illness    Withdrawing from friends and family, or sudden changes in social behaviors or hobbies.  Aggression toward people and animals.  Repeatedly breaking serious rules or breaking the law.  Persistent feelings of sadness, hopelessness, or thoughts of suicide.  Having persistent thoughts or urges that are unpleasant or feel out of control (involuntary). The person may feel the need to act on the urges in order to reduce anxiety.  False beliefs (delusions).  Seeing, hearing, tasting, smelling, or feeling things that are not real (hallucinations).  Other signs include:  Sleeping too much or too little.  Weight loss or weight gain.  Being easily distracted.  Extreme mood changes (mood swings).  Confused thinking or trouble concentrating.  How is this diagnosed?  Substance use disorder and mental illnesses can be difficult to diagnose at the same time because of how varied and complex the symptoms are. Because these conditions can interact, one condition may be missed. This is why it is important to be completely honest with your health care provider about substance use and your other symptoms.    Diagnosing your condition may include:  A physical exam.  A review of your medical history and your symptoms.  Your health care provider may refer you to a mental health professional for a psychiatric evaluation. This may include assessments of:  Your use of substances.  Your risk of suicide.  Your risk of aggressive behaviors.  Your lifestyle, environment, and social situations.  Your medical health.  Your mental health and behavioral history.  How is this treated?  Three people participating in a support group.  It is best to treat substance use disorder and mental illness at the same time (integrated treatment approach). Treatment usually involves more than one of the following methods:  Detox. This refers to stopping substance abuse while being monitored by trained medical staff. This is usually the first step in treatment. Detox can last for up to 7 days.  Rehabilitation. This involves staying in a treatment center where you can have medical and mental health support all the time.  Medicines to relieve symptoms of mental illness and to control symptoms that are caused by stopping substance abuse (withdrawal symptoms).  Support groups. These groups encourage you to talk about your fears, frustrations, and anxieties with others who have the same condition.  Talk therapy. This is one-on-one therapy that can help you learn about your illness and learn ways to cope with symptoms or side effects.  Follow these instructions at home:  Medicines    Take over-the-counter and prescription medicines only as told by your health care provider.  Do not stop taking medicines unless you ask your health care provider if it is safe to do that.  Tell your health care provider about any medicine side effects that you experience.  Lifestyle    Exercise regularly. Aim for 150 minutes of moderate exercise (such as walking or biking) or 75 minutes of vigorous exercise (such as running) each week.  Eat a healthy diet with plenty of fruits and vegetables, whole grains, and lean proteins.  Avoid caffeine and tobacco. These can worsen symptoms and anxiety.  Do not drink alcohol or use drugs.  Try to get 7–9 hours of sleep each night. To do this:  Keep your bedroom cool and dark.  Do not eat a heavy meal during the hour before you go to bed.  Do not have caffeine before bedtime.  Avoid screen time during the few hours before bedtime. This means not watching TV and not using a computer, mobile phone, or tablet.  General instructions    Follow your treatment plan as directed. Work with your health care provider to adjust your treatment plan as needed.  Attend support group or therapy sessions as directed.  Explain your diagnosis to your friends and family. Let them know what your symptoms are and what things cause your symptoms to start (triggers).  Avoid triggers or stressors that may worsen your symptoms or cause you to use alcohol or drugs. Spend time with family and friends who do not use substances.  Make time to relax and do self-soothing activities, such as meditating or listening to music.  Keep all follow-up visits. This is important.  Where to find support  You may find support for coping with substance use disorder and mental illness from:  Your health care providers or your therapist. These providers can treat you or can help you find services to treat your condition.  Local support groups for people with your condition. Your health care provider or therapist may be able to recommend a support group. This may be a hospital support group, a National [place] on Mental Illness (JORDYN) support group, or a 12-step group such as Alcoholics Anonymous (AA) or Narcotics Anonymous (NA).  Family and friends. Let them know what they can do to best support you through your recovery process.  Where to find more information  Substance Abuse and Mental Health Services Administration (SAMHSA):  Online: www.samhsa.gov  National Helpline, to talk with a person who can help you find information about treatment services in your area: 1-147.502.5139 (5-396-OWRTEF8)  U.[place] Department of Health and Human Services mental health services: www.mentalhealth.gov  National [place] on Mental Illness (JORDYN): www.jordyn.org  Contact a health care provider if:  Your symptoms get worse or they do not get better.  You have negative side effects from taking medicines.  You want to discuss stopping medicines or treatment.  You start using a substance again (have a relapse).  Get help right away if:  You have thoughts about harming yourself or others.  If you ever feel like you may hurt yourself or others, or have thoughts about taking your own life, get help right away. Go to your nearest emergency department or:  Call your local emergency services (821 in the [place]  Call a suicide crisis helpline, such as the National Suicide Prevention Lifeline at 1-464.900.6643 or 281 in the [place] This is open 24 hours a day in the [place]  If you’re a :  Call 988 and press 1. This is open 24 hours a day.  Text the Veterans Crisis Line at 084589.  Summary  Substance use disorder and mental illness can occur at the same time (co-occurring disorders), and they may be diagnosed together (dual diagnosis).  These conditions can be difficult to diagnose at the same time. It is important to be completely honest with your health care provider about your substance use and your other symptoms.  It is best to treat substance use disorder and mental illness at the same time (integrated treatment approach).  Identifying new ways to deal with triggers and difficult situations is an important part of recovery.  Keep all follow-up visits. Attend support groups or treatment programs as directed.  This information is not intended to replace advice given to you by your health care provider. Make sure you discuss any questions you have with your health care provider.

## 2024-09-14 NOTE — ED PROVIDER NOTE - PATIENT PORTAL LINK FT
You can access the FollowMyHealth Patient Portal offered by [facility] by registering at the following website: [email or web address]. By joining [facility]’s FollowMyHealth portal, you will also be able to view your health information using other applications (apps) compatible with our system.

## 2024-12-05 ENCOUNTER — APPOINTMENT (OUTPATIENT)
Dept: ORTHOPEDIC SURGERY | Facility: CLINIC | Age: 39
End: 2024-12-05
Payer: MEDICAID

## 2024-12-05 VITALS
DIASTOLIC BLOOD PRESSURE: 82 MMHG | WEIGHT: 215 LBS | HEIGHT: 69 IN | SYSTOLIC BLOOD PRESSURE: 132 MMHG | BODY MASS INDEX: 31.84 KG/M2 | HEART RATE: 74 BPM

## 2024-12-05 DIAGNOSIS — M25.561 PAIN IN RIGHT KNEE: ICD-10-CM

## 2024-12-05 DIAGNOSIS — M17.11 UNILATERAL PRIMARY OSTEOARTHRITIS, RIGHT KNEE: ICD-10-CM

## 2024-12-05 DIAGNOSIS — M25.422 EFFUSION, LEFT ELBOW: ICD-10-CM

## 2024-12-05 PROCEDURE — 20605 DRAIN/INJ JOINT/BURSA W/O US: CPT | Mod: LT

## 2024-12-05 PROCEDURE — 99204 OFFICE O/P NEW MOD 45 MIN: CPT | Mod: 25

## 2024-12-05 PROCEDURE — 73562 X-RAY EXAM OF KNEE 3: CPT | Mod: RT

## 2024-12-05 RX ORDER — HYALURONATE SODIUM, STABILIZED 60 MG/3 ML
60 SYRINGE (ML) INTRAARTICULAR
Qty: 1 | Refills: 0 | Status: ACTIVE | COMMUNITY
Start: 2024-12-05